# Patient Record
Sex: FEMALE | Employment: UNEMPLOYED | URBAN - METROPOLITAN AREA
[De-identification: names, ages, dates, MRNs, and addresses within clinical notes are randomized per-mention and may not be internally consistent; named-entity substitution may affect disease eponyms.]

---

## 2022-09-29 ENCOUNTER — HOSPITAL ENCOUNTER (OUTPATIENT)
Dept: MAMMOGRAPHY | Age: 57
Discharge: HOME | End: 2022-10-02

## 2022-09-29 DIAGNOSIS — Z12.31 SCREENING MAMMOGRAM FOR BREAST CANCER: ICD-10-CM

## 2022-09-29 PROCEDURE — 77063 BREAST TOMOSYNTHESIS BI: CPT

## 2023-01-04 ENCOUNTER — HOSPITAL ENCOUNTER (OUTPATIENT)
Dept: MRI IMAGING | Age: 58
Discharge: HOME | End: 2023-01-07

## 2023-01-04 DIAGNOSIS — M54.41 ACUTE BACK PAIN WITH SCIATICA, RIGHT: ICD-10-CM

## 2023-04-26 ENCOUNTER — OFFICE VISIT (OUTPATIENT)
Dept: NEUROSURGERY | Age: 58
End: 2023-04-26
Payer: COMMERCIAL

## 2023-04-26 VITALS
BODY MASS INDEX: 27.56 KG/M2 | TEMPERATURE: 96.8 F | WEIGHT: 146 LBS | HEIGHT: 61 IN | HEART RATE: 74 BPM | OXYGEN SATURATION: 94 % | DIASTOLIC BLOOD PRESSURE: 51 MMHG | SYSTOLIC BLOOD PRESSURE: 112 MMHG

## 2023-04-26 DIAGNOSIS — M43.10 RETROLISTHESIS OF VERTEBRAE: ICD-10-CM

## 2023-04-26 DIAGNOSIS — M48.061 NEUROFORAMINAL STENOSIS OF LUMBAR SPINE: ICD-10-CM

## 2023-04-26 DIAGNOSIS — M51.36 DEGENERATIVE DISC DISEASE, LUMBAR: Primary | ICD-10-CM

## 2023-04-26 PROCEDURE — 99203 OFFICE O/P NEW LOW 30 MIN: CPT | Performed by: NEUROLOGICAL SURGERY

## 2023-04-26 RX ORDER — ACETAMINOPHEN 500 MG
1000 TABLET ORAL EVERY 6 HOURS PRN
COMMUNITY
Start: 2017-12-01

## 2023-04-26 NOTE — PROGRESS NOTES
[facility] CLINIC NOTE:   History of Present Illness:    CC: Evaluation of right low back pain and right lower extremity pain    Sasha Cowan is a 59 y.o. female who presents today for evaluation of right low back pain and lower extremity pain. Patient states that in November 2022 there were no inciting events but she began to experience right leg pain that radiated down the level of her knee. She states that the pain then radiated up potentially to the lower back and over the course of the few months of November until March she experiences pain discomfort. She states that she had been working with physical therapy rigidly and that she had tried an oral steroid Dosepak that did not help significantly. She did over a months worth of comfort anti-inflammatory medication therapy with meloxicam and that did seem to lessen the inflammation. She states that starting in March she began to be able to walk approximately twice a day and that the pain significantly decreased. She currently has no pain or discomfort. She denies any numbness or tingling or weakness. She is currently retired. She does not take any antiplatelet or anticoag medications and she is non-smoker. She states she has had a history of low back pain and discomfort as well as right lower extremity pain discomfort occurring previously in 2021 and lasting for a few weeks or months. The patient have an MRI lumbar spine without contrast performed on 1/4/2023 that demonstrates advanced degenerative disc disease at the L5-S1 level with complete disc height collapse there is bone-on-bone appearance at this level with slight retropulsion of L5 in relation S1. There is moderate to severe neuroforaminal stenosis at L5-S1 secondary to degenerative disease and disc collapse. The conus medullaris terminates at L1-L2. There is well-preserved lumbar lordosis. History reviewed. No pertinent past medical history.   Past